# Patient Record
Sex: FEMALE | Race: WHITE | NOT HISPANIC OR LATINO | Employment: FULL TIME | URBAN - METROPOLITAN AREA
[De-identification: names, ages, dates, MRNs, and addresses within clinical notes are randomized per-mention and may not be internally consistent; named-entity substitution may affect disease eponyms.]

---

## 2024-04-16 ASSESSMENT — PROMIS GLOBAL HEALTH SCALE
RATE_QUALITY_OF_LIFE: VERY GOOD
RATE_AVERAGE_PAIN: 0
RATE_AVERAGE_FATIGUE: MODERATE
RATE_PHYSICAL_HEALTH: GOOD
EMOTIONAL_PROBLEMS: RARELY
RATE_SOCIAL_SATISFACTION: VERY GOOD
CARRYOUT_SOCIAL_ACTIVITIES: VERY GOOD
CARRYOUT_PHYSICAL_ACTIVITIES: COMPLETELY
RATE_GENERAL_HEALTH: GOOD
RATE_MENTAL_HEALTH: GOOD

## 2024-04-17 ENCOUNTER — LAB (OUTPATIENT)
Dept: LAB | Facility: LAB | Age: 49
End: 2024-04-17
Payer: COMMERCIAL

## 2024-04-17 ENCOUNTER — OFFICE VISIT (OUTPATIENT)
Dept: PRIMARY CARE | Facility: CLINIC | Age: 49
End: 2024-04-17
Payer: COMMERCIAL

## 2024-04-17 VITALS
OXYGEN SATURATION: 99 % | WEIGHT: 127 LBS | DIASTOLIC BLOOD PRESSURE: 72 MMHG | BODY MASS INDEX: 24.94 KG/M2 | SYSTOLIC BLOOD PRESSURE: 116 MMHG | HEART RATE: 89 BPM | HEIGHT: 60 IN

## 2024-04-17 DIAGNOSIS — Z00.00 ROUTINE GENERAL MEDICAL EXAMINATION AT A HEALTH CARE FACILITY: ICD-10-CM

## 2024-04-17 DIAGNOSIS — E03.9 ACQUIRED HYPOTHYROIDISM: ICD-10-CM

## 2024-04-17 DIAGNOSIS — Z82.3 FAMILY HISTORY OF CEREBROVASCULAR ACCIDENT: ICD-10-CM

## 2024-04-17 DIAGNOSIS — R53.83 FATIGUE, UNSPECIFIED TYPE: ICD-10-CM

## 2024-04-17 DIAGNOSIS — E78.2 MIXED HYPERLIPIDEMIA: ICD-10-CM

## 2024-04-17 DIAGNOSIS — N95.1 PERIMENOPAUSE: ICD-10-CM

## 2024-04-17 DIAGNOSIS — E55.9 VITAMIN D DEFICIENCY: ICD-10-CM

## 2024-04-17 DIAGNOSIS — Z12.11 ENCOUNTER FOR SCREENING FOR MALIGNANT NEOPLASM OF COLON: ICD-10-CM

## 2024-04-17 DIAGNOSIS — R79.0 LOW MAGNESIUM LEVEL: ICD-10-CM

## 2024-04-17 DIAGNOSIS — E53.8 VITAMIN B12 DEFICIENCY: ICD-10-CM

## 2024-04-17 DIAGNOSIS — Z00.00 ROUTINE GENERAL MEDICAL EXAMINATION AT A HEALTH CARE FACILITY: Primary | ICD-10-CM

## 2024-04-17 DIAGNOSIS — L98.9 SKIN LESIONS: ICD-10-CM

## 2024-04-17 DIAGNOSIS — R35.1 NOCTURIA: ICD-10-CM

## 2024-04-17 DIAGNOSIS — Z13.6 SCREENING FOR HEART DISEASE: ICD-10-CM

## 2024-04-17 LAB
25(OH)D3 SERPL-MCNC: 32 NG/ML (ref 30–100)
ALBUMIN SERPL BCP-MCNC: 4.6 G/DL (ref 3.4–5)
ALP SERPL-CCNC: 61 U/L (ref 33–110)
ALT SERPL W P-5'-P-CCNC: 15 U/L (ref 7–45)
ANION GAP SERPL CALC-SCNC: 15 MMOL/L (ref 10–20)
APPEARANCE UR: CLEAR
AST SERPL W P-5'-P-CCNC: 17 U/L (ref 9–39)
BASOPHILS # BLD AUTO: 0.02 X10*3/UL (ref 0–0.1)
BASOPHILS NFR BLD AUTO: 0.4 %
BILIRUB SERPL-MCNC: 0.4 MG/DL (ref 0–1.2)
BILIRUB UR STRIP.AUTO-MCNC: NEGATIVE MG/DL
BUN SERPL-MCNC: 7 MG/DL (ref 6–23)
CALCIUM SERPL-MCNC: 9.1 MG/DL (ref 8.6–10.3)
CHLORIDE SERPL-SCNC: 105 MMOL/L (ref 98–107)
CHOLEST SERPL-MCNC: 191 MG/DL (ref 0–199)
CHOLESTEROL/HDL RATIO: 3.2
CO2 SERPL-SCNC: 24 MMOL/L (ref 21–32)
COLOR UR: YELLOW
CREAT SERPL-MCNC: 0.71 MG/DL (ref 0.5–1.05)
CRP SERPL HS-MCNC: 1.1 MG/L
EGFRCR SERPLBLD CKD-EPI 2021: >90 ML/MIN/1.73M*2
EOSINOPHIL # BLD AUTO: 0.05 X10*3/UL (ref 0–0.7)
EOSINOPHIL NFR BLD AUTO: 1.1 %
ERYTHROCYTE [DISTWIDTH] IN BLOOD BY AUTOMATED COUNT: 13.2 % (ref 11.5–14.5)
ERYTHROCYTE [SEDIMENTATION RATE] IN BLOOD BY WESTERGREN METHOD: 28 MM/H (ref 0–20)
EST. AVERAGE GLUCOSE BLD GHB EST-MCNC: 94 MG/DL
FERRITIN SERPL-MCNC: 37 NG/ML (ref 8–150)
FOLATE SERPL-MCNC: >24 NG/ML
GLUCOSE SERPL-MCNC: 91 MG/DL (ref 74–99)
GLUCOSE UR STRIP.AUTO-MCNC: NEGATIVE MG/DL
HBA1C MFR BLD: 4.9 %
HCT VFR BLD AUTO: 41.1 % (ref 36–46)
HDLC SERPL-MCNC: 59 MG/DL
HGB BLD-MCNC: 13.3 G/DL (ref 12–16)
HOLD SPECIMEN: NORMAL
IMM GRANULOCYTES # BLD AUTO: 0.01 X10*3/UL (ref 0–0.7)
IMM GRANULOCYTES NFR BLD AUTO: 0.2 % (ref 0–0.9)
IRON SATN MFR SERPL: 19 % (ref 25–45)
IRON SERPL-MCNC: 67 UG/DL (ref 35–150)
KETONES UR STRIP.AUTO-MCNC: NEGATIVE MG/DL
LDLC SERPL CALC-MCNC: 113 MG/DL
LEUKOCYTE ESTERASE UR QL STRIP.AUTO: NEGATIVE
LYMPHOCYTES # BLD AUTO: 1.23 X10*3/UL (ref 1.2–4.8)
LYMPHOCYTES NFR BLD AUTO: 26.7 %
MAGNESIUM SERPL-MCNC: 2.25 MG/DL (ref 1.6–2.4)
MCH RBC QN AUTO: 29.3 PG (ref 26–34)
MCHC RBC AUTO-ENTMCNC: 32.4 G/DL (ref 32–36)
MCV RBC AUTO: 91 FL (ref 80–100)
MONOCYTES # BLD AUTO: 0.31 X10*3/UL (ref 0.1–1)
MONOCYTES NFR BLD AUTO: 6.7 %
NEUTROPHILS # BLD AUTO: 2.98 X10*3/UL (ref 1.2–7.7)
NEUTROPHILS NFR BLD AUTO: 64.9 %
NITRITE UR QL STRIP.AUTO: NEGATIVE
NON HDL CHOLESTEROL: 132 MG/DL (ref 0–149)
NRBC BLD-RTO: 0 /100 WBCS (ref 0–0)
PH UR STRIP.AUTO: 7 [PH]
PLATELET # BLD AUTO: 305 X10*3/UL (ref 150–450)
POTASSIUM SERPL-SCNC: 4.6 MMOL/L (ref 3.5–5.3)
PROT SERPL-MCNC: 7.1 G/DL (ref 6.4–8.2)
PROT UR STRIP.AUTO-MCNC: NEGATIVE MG/DL
RBC # BLD AUTO: 4.54 X10*6/UL (ref 4–5.2)
RBC # UR STRIP.AUTO: NEGATIVE /UL
SODIUM SERPL-SCNC: 139 MMOL/L (ref 136–145)
SP GR UR STRIP.AUTO: 1.01
T3FREE SERPL-MCNC: 3.3 PG/ML (ref 2.3–4.2)
T4 FREE SERPL-MCNC: 0.73 NG/DL (ref 0.61–1.12)
THYROPEROXIDASE AB SERPL-ACNC: 56 IU/ML
TIBC SERPL-MCNC: 345 UG/DL (ref 240–445)
TRIGL SERPL-MCNC: 94 MG/DL (ref 0–149)
TSH SERPL-ACNC: 1.93 MIU/L (ref 0.44–3.98)
UIBC SERPL-MCNC: 278 UG/DL (ref 110–370)
UROBILINOGEN UR STRIP.AUTO-MCNC: <2 MG/DL
VIT B12 SERPL-MCNC: 494 PG/ML (ref 211–911)
VLDL: 19 MG/DL (ref 0–40)
WBC # BLD AUTO: 4.6 X10*3/UL (ref 4.4–11.3)

## 2024-04-17 PROCEDURE — 99396 PREV VISIT EST AGE 40-64: CPT | Performed by: FAMILY MEDICINE

## 2024-04-17 PROCEDURE — 85652 RBC SED RATE AUTOMATED: CPT

## 2024-04-17 PROCEDURE — 82607 VITAMIN B-12: CPT

## 2024-04-17 PROCEDURE — 86141 C-REACTIVE PROTEIN HS: CPT

## 2024-04-17 PROCEDURE — 1036F TOBACCO NON-USER: CPT | Performed by: FAMILY MEDICINE

## 2024-04-17 PROCEDURE — 36415 COLL VENOUS BLD VENIPUNCTURE: CPT

## 2024-04-17 PROCEDURE — 80053 COMPREHEN METABOLIC PANEL: CPT

## 2024-04-17 PROCEDURE — 83735 ASSAY OF MAGNESIUM: CPT

## 2024-04-17 PROCEDURE — 84630 ASSAY OF ZINC: CPT

## 2024-04-17 PROCEDURE — 82746 ASSAY OF FOLIC ACID SERUM: CPT

## 2024-04-17 PROCEDURE — 81003 URINALYSIS AUTO W/O SCOPE: CPT

## 2024-04-17 PROCEDURE — 82306 VITAMIN D 25 HYDROXY: CPT

## 2024-04-17 PROCEDURE — 83550 IRON BINDING TEST: CPT

## 2024-04-17 PROCEDURE — 82728 ASSAY OF FERRITIN: CPT

## 2024-04-17 PROCEDURE — 86800 THYROGLOBULIN ANTIBODY: CPT

## 2024-04-17 PROCEDURE — 84481 FREE ASSAY (FT-3): CPT

## 2024-04-17 PROCEDURE — 80061 LIPID PANEL: CPT

## 2024-04-17 PROCEDURE — 86376 MICROSOMAL ANTIBODY EACH: CPT

## 2024-04-17 PROCEDURE — 84439 ASSAY OF FREE THYROXINE: CPT

## 2024-04-17 PROCEDURE — 83540 ASSAY OF IRON: CPT

## 2024-04-17 PROCEDURE — 84443 ASSAY THYROID STIM HORMONE: CPT

## 2024-04-17 PROCEDURE — 85025 COMPLETE CBC W/AUTO DIFF WBC: CPT

## 2024-04-17 PROCEDURE — 83036 HEMOGLOBIN GLYCOSYLATED A1C: CPT

## 2024-04-17 RX ORDER — ROSUVASTATIN CALCIUM 40 MG/1
40 TABLET, FILM COATED ORAL EVERY 24 HOURS
COMMUNITY
Start: 2019-01-15

## 2024-04-17 ASSESSMENT — ENCOUNTER SYMPTOMS
TREMORS: 0
CONSTIPATION: 1
VOMITING: 0
FATIGUE: 1
NERVOUS/ANXIOUS: 0
WEAKNESS: 0
LOSS OF SENSATION IN FEET: 0
OCCASIONAL FEELINGS OF UNSTEADINESS: 0
ARTHRALGIAS: 0
MYALGIAS: 0
FREQUENCY: 0
SHORTNESS OF BREATH: 0
COUGH: 0
CHILLS: 0
WHEEZING: 0
HEMATURIA: 0
PALPITATIONS: 0
FEVER: 0
DIZZINESS: 0
CONFUSION: 0
NAUSEA: 0
DEPRESSION: 0

## 2024-04-17 ASSESSMENT — PATIENT HEALTH QUESTIONNAIRE - PHQ9
SUM OF ALL RESPONSES TO PHQ9 QUESTIONS 1 AND 2: 0
2. FEELING DOWN, DEPRESSED OR HOPELESS: NOT AT ALL
1. LITTLE INTEREST OR PLEASURE IN DOING THINGS: NOT AT ALL

## 2024-04-17 ASSESSMENT — PAIN SCALES - GENERAL: PAINLEVEL: 0-NO PAIN

## 2024-04-17 NOTE — PROGRESS NOTES
Subjective   Patient ID: Maddie Bolanos is a 48 y.o. female who presents for Annual Exam.     Hyperlipidemia:          c/o Lipid abnormalities.          Compared to the last visit the hyperlipidemia has has not been checked, pt due.          Response to therapy has been borderline goals - LDL mildly elevated, strong family history of hyperlipidemia.          Side effects of the medications include none.          Compliance with the medical regimen has been as prescribed, on crestor.          Impediments to compliance with the medical regimen has been none.      Medications reviewed:          Current medications Use reviewed and recorded.          Vitamin/Mineral supplements Use reviewed and recorded.      Review family history:          Reviewed See family history.      Review past history:          Reviewed See past history.      Review surgical history:          Reviewed See surgical history.      Gastroenterology:          family history of celiac -follows a gluten free diet.     Hypothyroidism:          Follow Up not currently on meds.          Hypothyroidism tolerating meds with no side effects.   She is perimenopausal - sees GYN for her paps and mammograms  Needs referral to derm  Referral to GI for a colonoscopy  Lab orders due         Review of Systems   Constitutional:  Positive for fatigue. Negative for chills and fever.   HENT:  Negative for ear pain, postnasal drip and tinnitus.    Respiratory:  Negative for cough, shortness of breath and wheezing.    Cardiovascular:  Negative for chest pain, palpitations and leg swelling.   Gastrointestinal:  Positive for constipation. Negative for nausea and vomiting.   Genitourinary:  Negative for frequency and hematuria.   Musculoskeletal:  Negative for arthralgias and myalgias.   Skin:  Negative for rash.   Neurological:  Negative for dizziness, tremors and weakness.   Psychiatric/Behavioral:  Negative for confusion. The patient is not nervous/anxious.    Vit D  def, bit B12 def, nocturia, perimenopause    Objective   /72   Pulse 89   Ht 1.524 m (5')   Wt 57.6 kg (127 lb)   SpO2 99%   BMI 24.80 kg/m²     Physical Exam  Vitals reviewed.   Constitutional:       General: She is not in acute distress.     Appearance: Normal appearance. She is not ill-appearing.   HENT:      Right Ear: Tympanic membrane normal.      Left Ear: Tympanic membrane normal.      Nose: No rhinorrhea.      Mouth/Throat:      Pharynx: No posterior oropharyngeal erythema.   Neck:      Vascular: No carotid bruit.   Cardiovascular:      Rate and Rhythm: Normal rate and regular rhythm.      Heart sounds: Normal heart sounds. No murmur heard.  Pulmonary:      Breath sounds: Normal breath sounds. No wheezing or rhonchi.   Abdominal:      General: Abdomen is flat. Bowel sounds are normal. There is no distension.      Palpations: Abdomen is soft.      Tenderness: There is no abdominal tenderness.   Genitourinary:     Comments: Sees GYN  Musculoskeletal:         General: No swelling.      Cervical back: Neck supple.      Right lower leg: No edema.      Left lower leg: No edema.   Skin:     Findings: No rash.      Comments: Various moles, skin lesions   Neurological:      General: No focal deficit present.      Mental Status: She is alert and oriented to person, place, and time.   Psychiatric:         Mood and Affect: Mood normal.         Behavior: Behavior normal.         Assessment/Plan   Problem List Items Addressed This Visit             ICD-10-CM    Mixed hyperlipidemia E78.2    Relevant Orders    Comprehensive Metabolic Panel (Completed)    Lipid Panel (Completed)    Vitamin D deficiency E55.9    Relevant Orders    Vitamin D 25-Hydroxy,Total (for eval of Vitamin D levels) (Completed)    Perimenopause N95.1    Relevant Orders    Zinc    Magnesium (Completed)    Hemoglobin A1C    Family history of cerebrovascular accident Z82.3    Relevant Orders    Sedimentation Rate (Completed)    High sensitivity  CRP (Completed)    Skin lesions L98.9     Other Visit Diagnoses         Codes    Routine general medical examination at a health care facility    -  Primary Z00.00    Relevant Orders    Urinalysis with Reflex Culture and Microscopic    Comprehensive Metabolic Panel (Completed)    CBC and Auto Differential (Completed)    TSH with reflex to Free T4 if abnormal (Completed)    Thyroxine, Free (Completed)    T3, free (Completed)    Anti-Thyroglobulin Antibody    Thyroid Peroxidase (TPO) Antibody (Completed)    Zinc    Magnesium (Completed)    Lipid Panel (Completed)    Hemoglobin A1C    Iron and TIBC (Completed)    Vitamin B12 (Completed)    Vitamin D 25-Hydroxy,Total (for eval of Vitamin D levels) (Completed)    Sedimentation Rate (Completed)    High sensitivity CRP (Completed)    Folate (Completed)    Ferritin (Completed)    Fatigue, unspecified type     R53.83    Relevant Orders    CBC and Auto Differential (Completed)    Iron and TIBC (Completed)    Folate (Completed)    Ferritin (Completed)    Vitamin B12 deficiency     E53.8    Relevant Orders    Vitamin B12 (Completed)    Nocturia     R35.1    Relevant Orders    Urinalysis with Reflex Culture and Microscopic    Low magnesium level     R79.0    Acquired hypothyroidism     E03.9    Relevant Orders    TSH with reflex to Free T4 if abnormal (Completed)    Thyroxine, Free (Completed)    T3, free (Completed)    Anti-Thyroglobulin Antibody    Thyroid Peroxidase (TPO) Antibody (Completed)    Encounter for screening for malignant neoplasm of colon     Z12.11    referral to GI     Relevant Orders    Referral to Gastroenterology    Screening for heart disease     Z13.6    Relevant Orders    CT cardiac scoring wo IV contrast

## 2024-04-19 LAB
THYROGLOB AB SERPL-ACNC: <0.9 IU/ML (ref 0–4)
ZINC SERPL-MCNC: 65.4 UG/DL (ref 60–120)

## 2024-07-12 ENCOUNTER — HOSPITAL ENCOUNTER (OUTPATIENT)
Dept: RADIOLOGY | Facility: HOSPITAL | Age: 49
Discharge: HOME | End: 2024-07-12
Payer: COMMERCIAL

## 2024-07-12 DIAGNOSIS — Z13.6 SCREENING FOR HEART DISEASE: ICD-10-CM

## 2024-07-12 PROCEDURE — 75571 CT HRT W/O DYE W/CA TEST: CPT

## 2024-07-15 ENCOUNTER — TELEPHONE (OUTPATIENT)
Dept: PRIMARY CARE | Facility: CLINIC | Age: 49
End: 2024-07-15
Payer: COMMERCIAL

## 2024-07-15 NOTE — TELEPHONE ENCOUNTER
Left message for patient regarding recent CT results.       Your RCA has a small amount of calcium - I recommend that you see Dr Dixon, cardiologist to see if he feels you  should have a stress test. Recommend to continue on your statin

## 2024-07-15 NOTE — TELEPHONE ENCOUNTER
----- Message from Kade Erickson sent at 7/13/2024 10:40 PM EDT -----    ----- Message -----  From: Interface, Radiology Results In  Sent: 7/13/2024  11:42 AM EDT  To: Kade Erickson MD

## 2024-07-16 ENCOUNTER — TELEPHONE (OUTPATIENT)
Dept: PRIMARY CARE | Facility: CLINIC | Age: 49
End: 2024-07-16
Payer: COMMERCIAL

## 2024-07-16 NOTE — TELEPHONE ENCOUNTER
----- Message from Kade rEickson sent at 7/13/2024 10:40 PM EDT -----    ----- Message -----  From: Interface, Radiology Results In  Sent: 7/13/2024  11:42 AM EDT  To: Kade Erickson MD

## 2024-10-11 ENCOUNTER — PATIENT MESSAGE (OUTPATIENT)
Dept: PRIMARY CARE | Facility: CLINIC | Age: 49
End: 2024-10-11
Payer: COMMERCIAL

## 2024-10-11 DIAGNOSIS — E78.2 MIXED HYPERLIPIDEMIA: Primary | ICD-10-CM

## 2024-10-14 RX ORDER — ROSUVASTATIN CALCIUM 40 MG/1
40 TABLET, COATED ORAL DAILY
Qty: 100 TABLET | Refills: 1 | Status: SHIPPED | OUTPATIENT
Start: 2024-10-14 | End: 2025-11-18

## 2024-10-15 RX ORDER — ROSUVASTATIN CALCIUM 40 MG/1
40 TABLET, COATED ORAL EVERY 24 HOURS
Qty: 90 TABLET | Refills: 3 | Status: SHIPPED | OUTPATIENT
Start: 2024-10-15 | End: 2025-10-15

## 2025-01-05 DIAGNOSIS — E78.2 MIXED HYPERLIPIDEMIA: Primary | ICD-10-CM

## 2025-01-11 ENCOUNTER — LAB (OUTPATIENT)
Dept: LAB | Facility: LAB | Age: 50
End: 2025-01-11
Payer: COMMERCIAL

## 2025-01-11 DIAGNOSIS — E78.2 MIXED HYPERLIPIDEMIA: ICD-10-CM

## 2025-01-11 LAB
ALBUMIN SERPL BCP-MCNC: 4.3 G/DL (ref 3.4–5)
ALP SERPL-CCNC: 59 U/L (ref 33–110)
ALT SERPL W P-5'-P-CCNC: 18 U/L (ref 7–45)
ANION GAP SERPL CALC-SCNC: 13 MMOL/L (ref 10–20)
AST SERPL W P-5'-P-CCNC: 18 U/L (ref 9–39)
BILIRUB SERPL-MCNC: 0.5 MG/DL (ref 0–1.2)
BUN SERPL-MCNC: 16 MG/DL (ref 6–23)
CALCIUM SERPL-MCNC: 8.6 MG/DL (ref 8.6–10.3)
CHLORIDE SERPL-SCNC: 104 MMOL/L (ref 98–107)
CHOLEST SERPL-MCNC: 158 MG/DL (ref 0–199)
CHOLESTEROL/HDL RATIO: 2.8
CO2 SERPL-SCNC: 27 MMOL/L (ref 21–32)
CREAT SERPL-MCNC: 0.75 MG/DL (ref 0.5–1.05)
EGFRCR SERPLBLD CKD-EPI 2021: >90 ML/MIN/1.73M*2
GLUCOSE SERPL-MCNC: 81 MG/DL (ref 74–99)
HDLC SERPL-MCNC: 55.6 MG/DL
LDLC SERPL CALC-MCNC: 89 MG/DL
NON HDL CHOLESTEROL: 102 MG/DL (ref 0–149)
POTASSIUM SERPL-SCNC: 4.7 MMOL/L (ref 3.5–5.3)
PROT SERPL-MCNC: 6.5 G/DL (ref 6.4–8.2)
SODIUM SERPL-SCNC: 139 MMOL/L (ref 136–145)
TRIGL SERPL-MCNC: 67 MG/DL (ref 0–149)
VLDL: 13 MG/DL (ref 0–40)

## 2025-01-11 PROCEDURE — 80061 LIPID PANEL: CPT

## 2025-01-11 PROCEDURE — 80053 COMPREHEN METABOLIC PANEL: CPT

## 2025-01-20 DIAGNOSIS — E78.2 MIXED HYPERLIPIDEMIA: ICD-10-CM

## 2025-01-21 RX ORDER — ROSUVASTATIN CALCIUM 40 MG/1
40 TABLET, COATED ORAL EVERY 24 HOURS
Qty: 90 TABLET | Refills: 1 | Status: SHIPPED | OUTPATIENT
Start: 2025-01-21 | End: 2025-07-20

## 2025-06-10 ENCOUNTER — OFFICE VISIT (OUTPATIENT)
Dept: PRIMARY CARE | Facility: CLINIC | Age: 50
End: 2025-06-10
Payer: COMMERCIAL

## 2025-06-10 VITALS
DIASTOLIC BLOOD PRESSURE: 64 MMHG | WEIGHT: 128.8 LBS | OXYGEN SATURATION: 99 % | HEIGHT: 60 IN | HEART RATE: 99 BPM | BODY MASS INDEX: 25.29 KG/M2 | SYSTOLIC BLOOD PRESSURE: 126 MMHG

## 2025-06-10 DIAGNOSIS — N95.1 PERIMENOPAUSE: ICD-10-CM

## 2025-06-10 DIAGNOSIS — Z23 ENCOUNTER FOR IMMUNIZATION: ICD-10-CM

## 2025-06-10 DIAGNOSIS — R35.1 NOCTURIA: ICD-10-CM

## 2025-06-10 DIAGNOSIS — E55.9 VITAMIN D DEFICIENCY: ICD-10-CM

## 2025-06-10 DIAGNOSIS — R53.83 FATIGUE, UNSPECIFIED TYPE: ICD-10-CM

## 2025-06-10 DIAGNOSIS — Z12.11 ENCOUNTER FOR SCREENING FOR MALIGNANT NEOPLASM OF COLON: ICD-10-CM

## 2025-06-10 DIAGNOSIS — Z00.00 ROUTINE GENERAL MEDICAL EXAMINATION AT A HEALTH CARE FACILITY: Primary | ICD-10-CM

## 2025-06-10 DIAGNOSIS — E78.2 MIXED HYPERLIPIDEMIA: ICD-10-CM

## 2025-06-10 PROCEDURE — 3008F BODY MASS INDEX DOCD: CPT | Performed by: FAMILY MEDICINE

## 2025-06-10 PROCEDURE — 99396 PREV VISIT EST AGE 40-64: CPT | Performed by: FAMILY MEDICINE

## 2025-06-10 PROCEDURE — 1036F TOBACCO NON-USER: CPT | Performed by: FAMILY MEDICINE

## 2025-06-10 PROCEDURE — 90750 HZV VACC RECOMBINANT IM: CPT | Performed by: FAMILY MEDICINE

## 2025-06-10 PROCEDURE — 90471 IMMUNIZATION ADMIN: CPT | Performed by: FAMILY MEDICINE

## 2025-06-10 RX ORDER — ROSUVASTATIN CALCIUM 40 MG/1
40 TABLET, COATED ORAL EVERY 24 HOURS
Qty: 90 TABLET | Refills: 3 | Status: SHIPPED | OUTPATIENT
Start: 2025-06-10 | End: 2025-12-07

## 2025-06-10 RX ORDER — EZETIMIBE 10 MG/1
10 TABLET ORAL DAILY
COMMUNITY

## 2025-06-10 ASSESSMENT — PAIN SCALES - GENERAL: PAINLEVEL_OUTOF10: 0-NO PAIN

## 2025-06-10 ASSESSMENT — PROMIS GLOBAL HEALTH SCALE
RATE_PHYSICAL_HEALTH: EXCELLENT
RATE_SOCIAL_SATISFACTION: EXCELLENT
RATE_QUALITY_OF_LIFE: EXCELLENT
CARRYOUT_PHYSICAL_ACTIVITIES: COMPLETELY
RATE_AVERAGE_PAIN: 0
EMOTIONAL_PROBLEMS: NEVER
RATE_MENTAL_HEALTH: EXCELLENT
CARRYOUT_SOCIAL_ACTIVITIES: EXCELLENT
RATE_GENERAL_HEALTH: EXCELLENT

## 2025-06-10 ASSESSMENT — ENCOUNTER SYMPTOMS
CONSTIPATION: 0
WHEEZING: 0
FREQUENCY: 0
SHORTNESS OF BREATH: 0
COUGH: 0
DIZZINESS: 0
PALPITATIONS: 0
CONFUSION: 0
FEVER: 0
MYALGIAS: 0
WEAKNESS: 0
HEMATURIA: 0
NERVOUS/ANXIOUS: 0
VOMITING: 0
ARTHRALGIAS: 0
CHILLS: 0
FATIGUE: 1
NAUSEA: 0

## 2025-06-10 ASSESSMENT — LIFESTYLE VARIABLES
AUDIT-C TOTAL SCORE: 0
HOW MANY STANDARD DRINKS CONTAINING ALCOHOL DO YOU HAVE ON A TYPICAL DAY: PATIENT DOES NOT DRINK
HOW OFTEN DO YOU HAVE A DRINK CONTAINING ALCOHOL: NEVER
SKIP TO QUESTIONS 9-10: 1
HOW OFTEN DO YOU HAVE SIX OR MORE DRINKS ON ONE OCCASION: NEVER

## 2025-06-10 ASSESSMENT — PATIENT HEALTH QUESTIONNAIRE - PHQ9
1. LITTLE INTEREST OR PLEASURE IN DOING THINGS: NOT AT ALL
SUM OF ALL RESPONSES TO PHQ9 QUESTIONS 1 AND 2: 0
2. FEELING DOWN, DEPRESSED OR HOPELESS: NOT AT ALL

## 2025-06-10 NOTE — PROGRESS NOTES
Subjective   Patient ID: Maddie Bolanos is a 49 y.o. female who presents for Annual Exam.        Hyperlipidemia:          c/o Lipid abnormalities.          Compared to the last visit the hyperlipidemia has has not been checked, pt due.          Response to therapy has been borderline goals - LDL mildly elevated, strong family history of hyperlipidemia.          Side effects of the medications include none.          Compliance with the medical regimen has been as prescribed, on crestor.          Impediments to compliance with the medical regimen has been none.      Medications reviewed:          Current medications Use reviewed and recorded.          Vitamin/Mineral supplements Use reviewed and recorded.      Review family history:          Reviewed See family history.      Review past history:          Reviewed See past history.      Review surgical history:          Reviewed See surgical history.      Gastroenterology:          family history of celiac -had an EGD neg for celiac, had colonoscopy - repeat in 10 years     Hypothyroidism:          Follow Up not currently on meds.          Hypothyroidism tolerating meds with no side effects.   She is perimenopausal - sees GYN for her paps and mammograms  Needs referral to derm  UTD on colonoscopy - repeat in 10 years  Lab orders due, fasting chol, perimenopause/ menopause symtpoms  Recommend shingrix           Review of Systems   Constitutional:  Positive for fatigue. Negative for chills and fever.   HENT:  Negative for ear pain and postnasal drip.    Respiratory:  Negative for cough, shortness of breath and wheezing.    Cardiovascular:  Negative for chest pain, palpitations and leg swelling.   Gastrointestinal:  Negative for constipation, nausea and vomiting.   Genitourinary:  Negative for frequency and hematuria.   Musculoskeletal:  Negative for arthralgias and myalgias.   Skin:  Negative for rash.   Neurological:  Negative for dizziness and weakness.    Psychiatric/Behavioral:  Negative for confusion. The patient is not nervous/anxious.        Objective   /64   Pulse 99   Ht 1.524 m (5')   Wt 58.4 kg (128 lb 12.8 oz)   SpO2 99%   BMI 25.15 kg/m²     Physical Exam  Constitutional:       General: She is not in acute distress.     Appearance: Normal appearance. She is not ill-appearing.   HENT:      Head: Normocephalic.      Right Ear: Tympanic membrane and external ear normal.      Left Ear: Tympanic membrane and external ear normal.      Nose: Nose normal. No congestion.      Mouth/Throat:      Mouth: Mucous membranes are moist.      Pharynx: No posterior oropharyngeal erythema.   Eyes:      Extraocular Movements: Extraocular movements intact.      Conjunctiva/sclera: Conjunctivae normal.      Pupils: Pupils are equal, round, and reactive to light.   Cardiovascular:      Rate and Rhythm: Normal rate and regular rhythm.      Pulses: Normal pulses.      Heart sounds: Normal heart sounds. No murmur heard.  Pulmonary:      Effort: Pulmonary effort is normal. No respiratory distress.      Breath sounds: Normal breath sounds. No wheezing.   Abdominal:      General: Bowel sounds are normal.      Palpations: Abdomen is soft. There is no mass.      Tenderness: There is no abdominal tenderness.   Genitourinary:     Comments: Sees GYN for pelvic and breast exam  Musculoskeletal:         General: Normal range of motion.      Cervical back: Neck supple. No tenderness.      Right lower leg: No edema.      Left lower leg: No edema.   Lymphadenopathy:      Cervical: No cervical adenopathy.   Skin:     General: Skin is warm.      Findings: No rash.   Neurological:      General: No focal deficit present.      Mental Status: She is alert and oriented to person, place, and time.      Gait: Gait normal.   Psychiatric:         Mood and Affect: Mood normal.         Behavior: Behavior normal.         Assessment/Plan   Problem List Items Addressed This Visit           ICD-10-CM     Mixed hyperlipidemia E78.2    Relevant Medications    rosuvastatin (Crestor) 40 mg tablet    Other Relevant Orders    Comprehensive Metabolic Panel    Lipid Panel    Vitamin D deficiency E55.9    Relevant Orders    Vitamin D 25-Hydroxy,Total (for eval of Vitamin D levels)    Perimenopause N95.1    Relevant Orders    FSH    Estradiol     Other Visit Diagnoses         Codes      Routine general medical examination at a health care facility    -  Primary Z00.00    Relevant Orders    Urinalysis with Reflex Culture and Microscopic    Comprehensive Metabolic Panel    CBC and Auto Differential    TSH with reflex to Free T4 if abnormal    Lipid Panel    Vitamin D 25-Hydroxy,Total (for eval of Vitamin D levels)    FSH    Estradiol      Encounter for immunization     Z23      Fatigue, unspecified type     R53.83    Relevant Orders    CBC and Auto Differential    TSH with reflex to Free T4 if abnormal      Nocturia     R35.1    Relevant Orders    Urinalysis with Reflex Culture and Microscopic      Encounter for screening for malignant neoplasm of colon     Z12.11    colonoscopy UTD 6/24 -repeat in 10 years

## 2025-06-14 LAB
25(OH)D3+25(OH)D2 SERPL-MCNC: 55 NG/ML (ref 30–100)
ALBUMIN SERPL-MCNC: 4.7 G/DL (ref 3.6–5.1)
ALP SERPL-CCNC: 63 U/L (ref 37–153)
ALT SERPL-CCNC: 23 U/L (ref 6–29)
ANION GAP SERPL CALCULATED.4IONS-SCNC: 12 MMOL/L (CALC) (ref 7–17)
APPEARANCE UR: CLEAR
AST SERPL-CCNC: 22 U/L (ref 10–35)
BACTERIA #/AREA URNS HPF: NORMAL /HPF
BACTERIA UR CULT: NORMAL
BASOPHILS # BLD AUTO: 18 CELLS/UL (ref 0–200)
BASOPHILS NFR BLD AUTO: 0.4 %
BILIRUB SERPL-MCNC: 0.6 MG/DL (ref 0.2–1.2)
BILIRUB UR QL STRIP: NEGATIVE
BUN SERPL-MCNC: 18 MG/DL (ref 7–25)
CALCIUM SERPL-MCNC: 9.1 MG/DL (ref 8.6–10.4)
CHLORIDE SERPL-SCNC: 103 MMOL/L (ref 98–110)
CHOLEST SERPL-MCNC: 161 MG/DL
CHOLEST/HDLC SERPL: 2.7 (CALC)
CO2 SERPL-SCNC: 23 MMOL/L (ref 20–32)
COLOR UR: YELLOW
CREAT SERPL-MCNC: 0.71 MG/DL (ref 0.5–1.03)
EGFRCR SERPLBLD CKD-EPI 2021: 104 ML/MIN/1.73M2
EOSINOPHIL # BLD AUTO: 68 CELLS/UL (ref 15–500)
EOSINOPHIL NFR BLD AUTO: 1.5 %
ERYTHROCYTE [DISTWIDTH] IN BLOOD BY AUTOMATED COUNT: 12.7 % (ref 11–15)
ESTRADIOL SERPL-MCNC: 105 PG/ML
FSH SERPL-ACNC: 2.7 MIU/ML
GLUCOSE SERPL-MCNC: 86 MG/DL (ref 65–99)
GLUCOSE UR QL STRIP: NEGATIVE
HCT VFR BLD AUTO: 40.8 % (ref 35–45)
HDLC SERPL-MCNC: 60 MG/DL
HGB BLD-MCNC: 13.4 G/DL (ref 11.7–15.5)
HGB UR QL STRIP: NEGATIVE
HYALINE CASTS #/AREA URNS LPF: NORMAL /LPF
KETONES UR QL STRIP: NEGATIVE
LDLC SERPL CALC-MCNC: 85 MG/DL (CALC)
LEUKOCYTE ESTERASE UR QL STRIP: NEGATIVE
LYMPHOCYTES # BLD AUTO: 1593 CELLS/UL (ref 850–3900)
LYMPHOCYTES NFR BLD AUTO: 35.4 %
MCH RBC QN AUTO: 29.6 PG (ref 27–33)
MCHC RBC AUTO-ENTMCNC: 32.8 G/DL (ref 32–36)
MCV RBC AUTO: 90.1 FL (ref 80–100)
MONOCYTES # BLD AUTO: 567 CELLS/UL (ref 200–950)
MONOCYTES NFR BLD AUTO: 12.6 %
NEUTROPHILS # BLD AUTO: 2255 CELLS/UL (ref 1500–7800)
NEUTROPHILS NFR BLD AUTO: 50.1 %
NITRITE UR QL STRIP: NEGATIVE
NONHDLC SERPL-MCNC: 101 MG/DL (CALC)
PH UR STRIP: 6 [PH] (ref 5–8)
PLATELET # BLD AUTO: 273 THOUSAND/UL (ref 140–400)
PMV BLD REES-ECKER: 9.3 FL (ref 7.5–12.5)
POTASSIUM SERPL-SCNC: 4 MMOL/L (ref 3.5–5.3)
PROT SERPL-MCNC: 7.7 G/DL (ref 6.1–8.1)
PROT UR QL STRIP: NEGATIVE
RBC # BLD AUTO: 4.53 MILLION/UL (ref 3.8–5.1)
RBC #/AREA URNS HPF: NORMAL /HPF
SERVICE CMNT-IMP: NORMAL
SODIUM SERPL-SCNC: 138 MMOL/L (ref 135–146)
SP GR UR STRIP: 1.01 (ref 1–1.03)
SQUAMOUS #/AREA URNS HPF: NORMAL /HPF
TRIGL SERPL-MCNC: 73 MG/DL
TSH SERPL-ACNC: 3.24 MIU/L
WBC # BLD AUTO: 4.5 THOUSAND/UL (ref 3.8–10.8)
WBC #/AREA URNS HPF: NORMAL /HPF

## 2025-06-16 ENCOUNTER — TELEPHONE (OUTPATIENT)
Dept: PRIMARY CARE | Facility: CLINIC | Age: 50
End: 2025-06-16
Payer: COMMERCIAL

## 2025-08-11 ENCOUNTER — CLINICAL SUPPORT (OUTPATIENT)
Dept: PRIMARY CARE | Facility: CLINIC | Age: 50
End: 2025-08-11
Payer: COMMERCIAL

## 2025-08-11 DIAGNOSIS — Z23 ENCOUNTER FOR IMMUNIZATION: Primary | ICD-10-CM

## 2025-08-11 PROCEDURE — 90750 HZV VACC RECOMBINANT IM: CPT | Performed by: FAMILY MEDICINE

## 2025-08-11 PROCEDURE — 90471 IMMUNIZATION ADMIN: CPT | Performed by: FAMILY MEDICINE
